# Patient Record
Sex: MALE | Race: WHITE | Employment: FULL TIME | URBAN - METROPOLITAN AREA
[De-identification: names, ages, dates, MRNs, and addresses within clinical notes are randomized per-mention and may not be internally consistent; named-entity substitution may affect disease eponyms.]

---

## 2019-04-11 ENCOUNTER — HOSPITAL ENCOUNTER (EMERGENCY)
Facility: CLINIC | Age: 44
Discharge: HOME | End: 2019-04-11
Attending: EMERGENCY MEDICINE | Admitting: EMERGENCY MEDICINE

## 2019-04-11 ENCOUNTER — APPOINTMENT (OUTPATIENT)
Dept: CT IMAGING | Facility: CLINIC | Age: 44
End: 2019-04-11
Attending: EMERGENCY MEDICINE

## 2019-04-11 VITALS
RESPIRATION RATE: 18 BRPM | SYSTOLIC BLOOD PRESSURE: 138 MMHG | WEIGHT: 210 LBS | DIASTOLIC BLOOD PRESSURE: 85 MMHG | OXYGEN SATURATION: 96 % | BODY MASS INDEX: 28.44 KG/M2 | HEART RATE: 64 BPM | HEIGHT: 72 IN | TEMPERATURE: 98.5 F

## 2019-04-11 DIAGNOSIS — R10.32 LLQ ABDOMINAL PAIN: ICD-10-CM

## 2019-04-11 DIAGNOSIS — K42.9 UMBILICAL HERNIA WITHOUT OBSTRUCTION AND WITHOUT GANGRENE: ICD-10-CM

## 2019-04-11 DIAGNOSIS — K40.90 LEFT INGUINAL HERNIA: ICD-10-CM

## 2019-04-11 LAB
ALBUMIN SERPL-MCNC: 4 G/DL (ref 3.4–5)
ALP SERPL-CCNC: 72 U/L (ref 40–150)
ALT SERPL W P-5'-P-CCNC: 85 U/L (ref 0–70)
ANION GAP SERPL CALCULATED.3IONS-SCNC: 5 MMOL/L (ref 3–14)
AST SERPL W P-5'-P-CCNC: 31 U/L (ref 0–45)
BASOPHILS # BLD AUTO: 0 10E9/L (ref 0–0.2)
BASOPHILS NFR BLD AUTO: 0.3 %
BILIRUB SERPL-MCNC: 0.3 MG/DL (ref 0.2–1.3)
BUN SERPL-MCNC: 17 MG/DL (ref 7–30)
CALCIUM SERPL-MCNC: 8.7 MG/DL (ref 8.5–10.1)
CHLORIDE SERPL-SCNC: 107 MMOL/L (ref 94–109)
CO2 SERPL-SCNC: 28 MMOL/L (ref 20–32)
CREAT SERPL-MCNC: 0.9 MG/DL (ref 0.66–1.25)
DIFFERENTIAL METHOD BLD: NORMAL
EOSINOPHIL # BLD AUTO: 0.1 10E9/L (ref 0–0.7)
EOSINOPHIL NFR BLD AUTO: 2 %
ERYTHROCYTE [DISTWIDTH] IN BLOOD BY AUTOMATED COUNT: 12.8 % (ref 10–15)
GFR SERPL CREATININE-BSD FRML MDRD: >90 ML/MIN/{1.73_M2}
GLUCOSE SERPL-MCNC: 85 MG/DL (ref 70–99)
HCT VFR BLD AUTO: 45.2 % (ref 40–53)
HGB BLD-MCNC: 15.5 G/DL (ref 13.3–17.7)
IMM GRANULOCYTES # BLD: 0 10E9/L (ref 0–0.4)
IMM GRANULOCYTES NFR BLD: 0.1 %
LIPASE SERPL-CCNC: 146 U/L (ref 73–393)
LYMPHOCYTES # BLD AUTO: 2.2 10E9/L (ref 0.8–5.3)
LYMPHOCYTES NFR BLD AUTO: 32 %
MCH RBC QN AUTO: 29.4 PG (ref 26.5–33)
MCHC RBC AUTO-ENTMCNC: 34.3 G/DL (ref 31.5–36.5)
MCV RBC AUTO: 86 FL (ref 78–100)
MONOCYTES # BLD AUTO: 0.7 10E9/L (ref 0–1.3)
MONOCYTES NFR BLD AUTO: 10.4 %
NEUTROPHILS # BLD AUTO: 3.9 10E9/L (ref 1.6–8.3)
NEUTROPHILS NFR BLD AUTO: 55.2 %
NRBC # BLD AUTO: 0 10*3/UL
NRBC BLD AUTO-RTO: 0 /100
PLATELET # BLD AUTO: 215 10E9/L (ref 150–450)
POTASSIUM SERPL-SCNC: 3.8 MMOL/L (ref 3.4–5.3)
PROT SERPL-MCNC: 7.7 G/DL (ref 6.8–8.8)
RBC # BLD AUTO: 5.27 10E12/L (ref 4.4–5.9)
SODIUM SERPL-SCNC: 140 MMOL/L (ref 133–144)
WBC # BLD AUTO: 7 10E9/L (ref 4–11)

## 2019-04-11 PROCEDURE — 74177 CT ABD & PELVIS W/CONTRAST: CPT

## 2019-04-11 PROCEDURE — 25000125 ZZHC RX 250: Performed by: EMERGENCY MEDICINE

## 2019-04-11 PROCEDURE — 25000128 H RX IP 250 OP 636: Performed by: EMERGENCY MEDICINE

## 2019-04-11 PROCEDURE — 85025 COMPLETE CBC W/AUTO DIFF WBC: CPT | Performed by: EMERGENCY MEDICINE

## 2019-04-11 PROCEDURE — 96360 HYDRATION IV INFUSION INIT: CPT

## 2019-04-11 PROCEDURE — 99285 EMERGENCY DEPT VISIT HI MDM: CPT | Mod: 25

## 2019-04-11 PROCEDURE — 83690 ASSAY OF LIPASE: CPT | Performed by: EMERGENCY MEDICINE

## 2019-04-11 PROCEDURE — 80053 COMPREHEN METABOLIC PANEL: CPT | Performed by: EMERGENCY MEDICINE

## 2019-04-11 RX ORDER — HYDROCODONE BITARTRATE AND ACETAMINOPHEN 5; 325 MG/1; MG/1
1 TABLET ORAL EVERY 6 HOURS PRN
Qty: 10 TABLET | Refills: 0 | Status: SHIPPED | OUTPATIENT
Start: 2019-04-11 | End: 2019-04-15

## 2019-04-11 RX ORDER — IOPAMIDOL 755 MG/ML
105 INJECTION, SOLUTION INTRAVASCULAR ONCE
Status: COMPLETED | OUTPATIENT
Start: 2019-04-11 | End: 2019-04-11

## 2019-04-11 RX ADMIN — SODIUM CHLORIDE 72 ML: 9 INJECTION, SOLUTION INTRAVENOUS at 17:32

## 2019-04-11 RX ADMIN — SODIUM CHLORIDE 1000 ML: 9 INJECTION, SOLUTION INTRAVENOUS at 16:56

## 2019-04-11 RX ADMIN — IOPAMIDOL 105 ML: 755 INJECTION, SOLUTION INTRAVENOUS at 17:31

## 2019-04-11 ASSESSMENT — ENCOUNTER SYMPTOMS
DIARRHEA: 0
NAUSEA: 0
BLOOD IN STOOL: 0
ABDOMINAL PAIN: 1
VOMITING: 0

## 2019-04-11 ASSESSMENT — MIFFLIN-ST. JEOR: SCORE: 1890.55

## 2019-04-11 NOTE — ED AVS SNAPSHOT
Emergency Department  [address] [place] [address]  Phone:  [phone]  Fax:  [phone]                                    Lee Ashley   MRN: [id number]    Department:   Emergency Department   Date of Visit:  4/11/2019           After Visit Summary Signature Page    I have received my discharge instructions, and my questions have been answered. I have discussed any challenges I see with this plan with the nurse or doctor.    ..........................................................................................................................................  Patient/Patient Representative Signature      ..........................................................................................................................................  Patient Representative Print Name and Relationship to Patient    ..................................................               ................................................  Date                                   Time    ..........................................................................................................................................  Reviewed by Signature/Title    ...................................................              ..............................................  Date                                               Time          22EPIC Rev 08/18

## 2019-04-11 NOTE — DISCHARGE INSTRUCTIONS

## 2019-04-11 NOTE — ED PROVIDER NOTES
History     Chief Complaint:  Abdominal Pain    HPI   Lee Ashley is a 44 year old male who presents to the emergency department today for evaluation of abdominal pain. Patient is from [place] and visiting here in [place] helping his mom go through stuff after his step-dad recently . He states he has been having constant dull left lower quadrant abdominal pain for the past week. Additionally, he notes that he was having excessive gas a couple weeks ago. Patient went to [facility] urgent care on  and his urine was unremarkable. Since his pain persisted and he started looking things online, he decided to come to the ED before he went back home to [place]. Additionally, patient states he has been having increased looser stools, but no diarrhea. He does have a known hernia in the left lower quadrant, which he thinks could be related to his pain. Patient has not had a colonoscopy performed. He denies bloody stool, testicular pain, penile pain, nausea, vomiting, or abdominal surgeries.    Allergies:  No Known Drug Allergies    Medications:    Medications reviewed. No current medications.     Past Medical History:    Medical history reviewed. No pertinent medical history.    Past Surgical History:    Surgical history reviewed. No pertinent surgical history.    Family History:    Family history reviewed. No pertinent family history.     Social History:  The patient was accompanied to the ED by himself.  Smoking Status: Never Smoker  Smokeless Tobacco: Never Used  Alcohol Use: Negative  Drug Use: Negative    Review of Systems   Gastrointestinal: Positive for abdominal pain (left lower quadrant). Negative for blood in stool, diarrhea, nausea and vomiting.   Genitourinary: Negative for penile pain and testicular pain.   All other systems reviewed and are negative.    Physical Exam     Patient Vitals for the past 24 hrs:   BP Temp Temp src Pulse Resp SpO2 Height Weight   19 1559 138/85 98.5  F  (36.9  C) Oral 64 18 96 % 1.829 m (6') 95.3 kg (210 lb)       Physical Exam  General: Alert, appears well-developed and well-nourished. Cooperative.     In mild distress  HEENT:  Head:  Atraumatic  Ears:  External ears are normal  Mouth/Throat:  Oropharynx is without erythema or exudate and mucous membranes are moist.   Eyes:   Conjunctivae normal and EOM are normal. No scleral icterus.  Neck:   Normal range of motion. Neck supple.  CV:  Normal rate, regular rhythm, normal heart sounds and radial pulses are 2+ and symmetric.  No murmur.  Resp:  Breath sounds are clear bilaterally    Non-labored, no retractions or accessory muscle use  GI:  Abdomen is soft, no distension, left lower quadrant tenderness. No rebound or guarding.  No CVA tenderness bilaterally  MS:  Normal range of motion. No edema.    Normal strength in all 4 extremities.     Back atraumatic.    No midline cervical, thoracic, or lumbar tenderness  Skin:  Warm and dry.  No rash or lesions noted.  Neuro:  Alert. Normal strength. GCS: 15  Psych:  Normal mood and affect.    Emergency Department Course     Imaging:  Radiology findings were communicated with the patient who voiced understanding of the findings.    CT Abdomen Pelvis w Contrast  1. No evidence of diverticulitis or other bowel inflammation or  obstruction.  2. Left inguinal hernia containing fat.  3. Umbilical hernia containing fat.  Reading per radiology    Laboratory:  Laboratory findings were communicated with the patient who voiced understanding of the findings.    Lipase: 146  CBC: WBC 7.0, HGB 15.5,   CMP: ALT 85(H) o/w WNL (Creatinine 0.90)    Interventions:  1656 NS 1000 ml IV    Emergency Department Course:    1605 Nursing notes and vitals reviewed.    1626 I performed an exam of the patient as documented above.     1641 IV was inserted and blood was drawn for laboratory testing, results above.    1723 The patient was sent for a CT abdomen pelvis while in the emergency  department, results above.     1804 Patient rechecked and updated.     I personally reviewed the lab and image results with the patient and answered all related questions prior to discharge.    Impression & Plan      Medical Decision Making:  Lee Ashley is a 44 year old male who presents to the emergency department today for evaluation of abdominal pain. The laboratory testing has returned normal.  Imaging is noted to be normal except for fat containing inguinal and umbilical hernias.  The exact etiology of the abdominal pain is likely referred pain from left inguinal fat containing hernia.  The differential diagnosis of abdominal pain is protean and includes: Appendicitis, Bowel Obstruction, Ulcer, Ischemia, Cholecystitis, Diverticulitis, Pancreatitis, UTI, Pyelonephritis, Enteritis/Colitis, AAA amongst many other etiologies.  The finding of point tenderness over the LLQ and inguinal region is likely secondary to known inguinal hernia.  Will be referred to outpatient general surgery for surgical consultation.  No signs of bowel obstruction and no signs of entrapped bowel within the hernia.  Patient given pain control for home use and plan for outpatient gen surgical consultation.  Return precautions and follow up instructions understood before discharge.  Discharged home after all questions answered.      Diagnosis:    ICD-10-CM    1. Left inguinal hernia K40.90    2. Umbilical hernia without obstruction and without gangrene K42.9    3. LLQ abdominal pain R10.32      Disposition:   The patient is discharged to home.    Discharge Medications:     Review of your medicines      START taking      Dose / Directions   HYDROcodone-acetaminophen 5-325 MG tablet  Commonly known as:  NORCO      Dose:  1 tablet  Take 1 tablet by mouth every 6 hours as needed for severe pain  Quantity:  10 tablet  Refills:  0           Where to get your medicines      Some of these will need a paper prescription and others can be  bought over the counter. Ask your nurse if you have questions.    Bring a paper prescription for each of these medications    HYDROcodone-acetaminophen 5-325 MG tablet         Scribe Disclosure:  I, Lisa Cheung, am serving as a scribe at 4:32 PM on 4/11/2019 to document services personally performed by Jorge Crabtree MD based on my observations and the provider's statements to me.     EMERGENCY DEPARTMENT       Jorge Crabtree MD  04/11/19 [id number]

## 2019-04-15 ENCOUNTER — OFFICE VISIT (OUTPATIENT)
Dept: SURGERY | Facility: CLINIC | Age: 44
End: 2019-04-15

## 2019-04-15 VITALS
HEIGHT: 72 IN | BODY MASS INDEX: 28.44 KG/M2 | OXYGEN SATURATION: 98 % | SYSTOLIC BLOOD PRESSURE: 138 MMHG | HEART RATE: 80 BPM | WEIGHT: 210 LBS | DIASTOLIC BLOOD PRESSURE: 80 MMHG | RESPIRATION RATE: 16 BRPM

## 2019-04-15 DIAGNOSIS — K40.90 LEFT INGUINAL HERNIA: ICD-10-CM

## 2019-04-15 DIAGNOSIS — K42.9 UMBILICAL HERNIA WITHOUT OBSTRUCTION AND WITHOUT GANGRENE: Primary | ICD-10-CM

## 2019-04-15 PROCEDURE — 99203 OFFICE O/P NEW LOW 30 MIN: CPT | Performed by: SURGERY

## 2019-04-15 ASSESSMENT — MIFFLIN-ST. JEOR: SCORE: 1890.55

## 2019-04-15 NOTE — NURSING NOTE
Pain Location: umbilical and groin    Pain type: dull and aching    Bulge: No    Bulge reducible: No    Symptoms: None    Time Frame of Hernia: 3 week(s) ago

## 2019-04-16 ENCOUNTER — TELEPHONE (OUTPATIENT)
Dept: SURGERY | Facility: CLINIC | Age: 44
End: 2019-04-16

## 2019-04-16 ENCOUNTER — HOSPITAL ENCOUNTER (OUTPATIENT)
Facility: CLINIC | Age: 44
End: 2019-04-16
Attending: SURGERY | Admitting: SURGERY

## 2019-04-16 RX ORDER — CEFAZOLIN SODIUM 1 G/3ML
1 INJECTION, POWDER, FOR SOLUTION INTRAMUSCULAR; INTRAVENOUS SEE ADMIN INSTRUCTIONS
Status: CANCELLED | OUTPATIENT
Start: 2019-04-16

## 2019-04-16 RX ORDER — CEFAZOLIN SODIUM 2 G/100ML
2 INJECTION, SOLUTION INTRAVENOUS
Status: CANCELLED | OUTPATIENT
Start: 2019-04-16

## 2019-04-16 NOTE — TELEPHONE ENCOUNTER
Type of surgery: Laparoscopic left inguinal hernia repair, possible bilateral and open umbilical hernia repair  Location of surgery: [facility]  Date and time of surgery: 5/7/19 at 7:30am  Surgeon: Dr. Lazaro Owens  Pre-Op Appt Date: Patient to schedule  Post-Op Appt Date: Patient to schedule   Packet sent out: Yes  Pre-cert/Authorization completed:  Not Applicable  Date: 4/15

## 2019-04-18 NOTE — PROGRESS NOTES
[facility]  Surgery Consultation    CONSULTATION REQUESTED BY:  No Ref-Primary, Physician None    HPI: Patient is a 44-year-old gentleman who presents as a self-referral for evaluation of left inguinal hernia.  Is also noted to have an umbilical hernia.  He began to experience discomfort in the periumbilical and left lower abdomen approximately a month ago.  This has been relatively persistent ever since.  He was evaluated and underwent imaging which showed a left inguinal hernia and umbilical hernia.  He has no signs or symptoms to suggest incarceration or strangulation.  No bowel obstruction symptoms.    PMH:   has no past medical history on file.  PSH:    has no past surgical history on file.  Social History:   reports that he has never smoked. He has never used smokeless tobacco. He reports that he drank alcohol. He reports that he has current or past drug history.  Family History:  family history is not on file.  Medications/Allergies: Home medications and allergies reviewed.    ROS:  The 10 point Review of Systems is negative other than noted in the HPI.    Physical Exam:  /80 (BP Location: Left arm, Cuff Size: Adult Regular)   Pulse 80   Resp 16   Ht 1.829 m (6')   Wt 95.3 kg (210 lb)   SpO2 98%   BMI 28.48 kg/m    GENERAL: Generally appears well.  Psych: Alert and Oriented.  Normal affect  Eyes: Sclera clear  Respiratory:  Lungs clear to ausculation bilaterally with good air excursion  Cardiovascular:  Regular Rate and Rhythm with no murmurs gallops or rubs, normal peripheral pulses  GI: Abdomen Non Distended Non-Tender  Umbilical hernia palpated.  Hernia easily reduciable..  Groin- I examined the patient in both the standing and supine positions. Right Groin- Small inguinal hernia. No hernia Palpated. Left Groin- Small inguinal hernia.  Hernia was easily reduciable.  Tenderness palpated in groin. No scrotal or testicle abnormalities.  Lymphatic/Hematologic/Immune:  No femoral  or cervical lymphadenopathy.  Integumentary:  No rashes  Neurological: grossly intact     All new lab and imaging data was reviewed.     Impression and Plan:  Patient is a 44 year old male with umbilical hernia and left inguinal hernia    PLAN: Recommend outpatient laparoscopic left inguinal hernia repair with mesh possible bilateral, open umbilical hernia repair with mesh.  I discussed the pathophysiology of hernias and options for repair including laparoscopic VS open.  The risks associated with the procedure including, but not limited to, recurrence, nerve entrapment or injury, persistence of pain, injury to the bowel/bladder, infertility, hematoma, mesh migration, mesh infection, MI, and PE were discussed with the patient. He indicated understanding of the discussion, asked appropriate questions, and provided consent. Signs and symptoms of incarceration were discussed. If these develop in the interim, he promises to call or go straight to the ER. I have provided the patient with an information pamphlet.    Thank you very much for this consult.    Lazaro Owens M.D.  [facility]  [phone]    Please route or send letter to:  Primary Care Provider (PCP) and Referring Provider

## 2019-11-04 ENCOUNTER — TRANSCRIPTION ENCOUNTER (OUTPATIENT)
Age: 44
End: 2019-11-04

## 2020-05-16 ENCOUNTER — TRANSCRIPTION ENCOUNTER (OUTPATIENT)
Age: 45
End: 2020-05-16

## 2020-07-21 ENCOUNTER — TRANSCRIPTION ENCOUNTER (OUTPATIENT)
Age: 45
End: 2020-07-21

## 2020-07-26 ENCOUNTER — TRANSCRIPTION ENCOUNTER (OUTPATIENT)
Age: 45
End: 2020-07-26

## 2022-07-28 PROBLEM — Z00.00 ENCOUNTER FOR PREVENTIVE HEALTH EXAMINATION: Status: ACTIVE | Noted: 2022-07-28

## 2022-08-18 ENCOUNTER — APPOINTMENT (OUTPATIENT)
Dept: PEDIATRIC ALLERGY IMMUNOLOGY | Facility: CLINIC | Age: 47
End: 2022-08-18

## 2023-03-13 ENCOUNTER — APPOINTMENT (OUTPATIENT)
Dept: GASTROENTEROLOGY | Facility: CLINIC | Age: 48
End: 2023-03-13